# Patient Record
Sex: FEMALE | Race: WHITE | NOT HISPANIC OR LATINO | Employment: OTHER | URBAN - METROPOLITAN AREA
[De-identification: names, ages, dates, MRNs, and addresses within clinical notes are randomized per-mention and may not be internally consistent; named-entity substitution may affect disease eponyms.]

---

## 2017-01-31 NOTE — PATIENT DISCUSSION
DRY EYE SYNDROME, OU: PRESCRIBED ARTIFICIAL TEARS BID - QID, OU (SYSTANE BALANCE). TREAT UNDERLYING BLEPHARITIS. RETURN FOR FOLLOW-UP AS SCHEDULED OR SOONER IF SYMPTOMS WORSEN.

## 2017-01-31 NOTE — PATIENT DISCUSSION
DERMATOCHALASIS (UPPER LIDS), OD&gt;OS:  VISUALLY SIGNIFICANT TO PATIENT. REFER TO OCULOPLASTIC SPECIALIST DR. ZAYAS FOR CONSULT.

## 2017-01-31 NOTE — PATIENT DISCUSSION
PCF OS &gt; OD: BECOMING VISUALLY SIGNIFICANT BUT NOT BOTHERSOME TO PATIENT AT THIS TIME. CONTINUE TO FOLLOW FOR NOW. OFFER SPEC RX UPDATE.

## 2017-01-31 NOTE — PATIENT DISCUSSION
GLAUCOMA SUSPECT, OU: ENLARGED OPTIC NERVE CUPPING AND POSITIVE FAMILY HISTORY. RETURN FOR FOLLOW UP AS SCHEDULED.

## 2017-01-31 NOTE — PATIENT DISCUSSION
Dermatochalasis Counseling:  I have explained the diagnosis of dermatochalasis to the patient. The resulting symptoms, including but not limited to visual field deficits, blepharitis, and/or dry eye symptoms from eyelid wick syndrome, that may result if left untreated were discussed with the patient. The patient understands that intervention is elective surgery and consultation with an oculoplastic specialist can be scheduled at their convenience.

## 2017-01-31 NOTE — PATIENT DISCUSSION
BLEPHARITIS, OU: PRESCRIBE WARM COMPRESSES AND SYSTANE EYELID SCRUBS BID AND ARTIFICIAL TEARS (SYSTANE BALANCE) QID. ADD A DAILY INTAKE OF OMEGA-3 FATTY ACIDS. WILL CONSIDER ZYLET OR LIPIFLOW TREATMENT NEXT VISIT IF NOT RESPONSIVE TO TREATMENT OR IF SYMPTOMS PERSIST. RETURN FOR FOLLOW-UP AS SCHEDULED.

## 2017-06-13 NOTE — PATIENT DISCUSSION
Ruiz Visual Field 36 point screen: I have reviewed the visual fields both taped and untaped on this patient which demonstrate significant obstruction of the patient's peripheral visual field on both eyes.

## 2017-08-18 NOTE — PATIENT DISCUSSION
Also, please do not hesitate to call us if you have any concerns not addressed by this information. Please call [phone] and we will do everything we can to help you during this period.

## 2017-11-03 ENCOUNTER — PREPPED CHART (OUTPATIENT)
Dept: URBAN - METROPOLITAN AREA CLINIC 46 | Facility: CLINIC | Age: 57
End: 2017-11-03

## 2017-11-03 ASSESSMENT — VISUAL ACUITY
OD_SC: CF 2FT
OD_CC: 20/20-1
OD_CC: J1
OD_SC: J3
OS_SC: J3
OS_CC: J1
OS_CC: 20/25+1
OS_SC: CF 2FT

## 2017-11-03 ASSESSMENT — TONOMETRY
OD_IOP_MMHG: 14
OS_IOP_MMHG: 14

## 2018-06-25 ENCOUNTER — ESTABLISHED COMPREHENSIVE EXAM (OUTPATIENT)
Dept: URBAN - METROPOLITAN AREA CLINIC 46 | Facility: CLINIC | Age: 58
End: 2018-06-25

## 2018-06-25 DIAGNOSIS — H52.7: ICD-10-CM

## 2018-06-25 DIAGNOSIS — H25.813: ICD-10-CM

## 2018-06-25 PROCEDURE — 92015 DETERMINE REFRACTIVE STATE: CPT

## 2018-06-25 PROCEDURE — 92014 COMPRE OPH EXAM EST PT 1/>: CPT

## 2018-06-25 ASSESSMENT — VISUAL ACUITY
OD_SC: 20/400
OS_SC: J3
OS_SC: 20/400
OD_SC: J2
OD_CC: J2
OS_CC: 20/25-1
OD_PH: 20/60-1
OS_BAT: 20/400
OS_CC: J3
OS_PH: 20/60-2
OD_BAT: 20/60
OD_CC: 20/20-2

## 2018-06-25 ASSESSMENT — TONOMETRY
OS_IOP_MMHG: 13
OD_IOP_MMHG: 14

## 2018-08-09 NOTE — PATIENT DISCUSSION
DRY EYE SYNDROME, OU: CONTINUE SYSTANE ARTIFICIAL TEARS BID - QID, OU. RETURN FOR FOLLOW-UP AS SCHEDULED OR SOONER IF SYMPTOMS WORSEN.

## 2018-08-09 NOTE — PATIENT DISCUSSION
POSTERIOR VITREOUS DETACHMENT, OU: NO HOLES OR TEARS 360' WITH INDIRECT EXAM OU. RETURN FOR FOLLOW-UP AS SCHEDULED.

## 2018-08-09 NOTE — PATIENT DISCUSSION
CONJUNCTIVITIS (ALLERGIC), OU:  PRESCRIBE PATANOL BID OU FOR RELIEF OF OCULAR ITCH AS NEEDED. RETURN FOR FOLLOW-UP AS SCHEDULED.

## 2018-08-09 NOTE — PATIENT DISCUSSION
New Prescription: Patanol (olopatadine): drops: 0.1% 1 drop twice a day as directed into both eyes 08-

## 2018-08-09 NOTE — PATIENT DISCUSSION
GLAUCOMA SUSPECT, OU:  POSITIVE FAMILY HISTORY AND CUPPING. STABLE RNFL AND IOP OU. RECOMMEND BASELINE 24-2 VISUAL FIELD. RETURN FOR FOLLOW UP AS SCHEDULED.

## 2019-10-03 NOTE — PATIENT DISCUSSION
GLAUCOMA SUSPECT, OU: POSITIVE FAMILY HISTORY AND CUPPING. STABLE IOP OU. ORDER BASELINE 24-2 VISUAL FIELD WITH SAME DAY RNFL OCT. RETURN FOR FOLLOW UP AS SCHEDULED.

## 2019-10-03 NOTE — PATIENT DISCUSSION
AMD (DRY), OU: PRESCRIBE AMSLER GRID QD/ UV PROTECTION. SMOKING CESSATION EMPHASIZED. RETURN FOR FOLLOW-UP AS SCHEDULED.

## 2019-11-25 ENCOUNTER — ESTABLISHED COMPREHENSIVE EXAM (OUTPATIENT)
Dept: URBAN - METROPOLITAN AREA CLINIC 46 | Facility: CLINIC | Age: 59
End: 2019-11-25

## 2019-11-25 DIAGNOSIS — H25.813: ICD-10-CM

## 2019-11-25 DIAGNOSIS — H52.7: ICD-10-CM

## 2019-11-25 PROCEDURE — 92015 DETERMINE REFRACTIVE STATE: CPT

## 2019-11-25 PROCEDURE — 92014 COMPRE OPH EXAM EST PT 1/>: CPT

## 2019-11-25 ASSESSMENT — VISUAL ACUITY
OS_CC: J2
OS_SC: J1+
OD_SC: J1+
OD_SC: 20/400
OD_CC: J2
OS_SC: 20/400
OS_CC: 20/30
OD_CC: 20/30

## 2019-11-25 ASSESSMENT — TONOMETRY
OD_IOP_MMHG: 15
OS_IOP_MMHG: 16

## 2020-10-05 NOTE — PATIENT DISCUSSION
GLAUCOMA SUSPECT, OU: POSITIVE FAMILY HISTORY AND CUPPING. STABLE IOP OU. OCT SHOWS THINNING INF OD- RTC FOR HVF/PACHS 6 MONTHS.

## 2020-10-05 NOTE — PATIENT DISCUSSION
CONJUNCTIVITIS (ALLERGIC), OU: PRESCRIBE OTC PATANOL BID OU FOR RELIEF OF OCULAR ITCH AS NEEDED. RETURN FOR FOLLOW-UP AS SCHEDULED.

## 2021-01-28 ENCOUNTER — ESTABLISHED COMPREHENSIVE EXAM (OUTPATIENT)
Dept: URBAN - METROPOLITAN AREA CLINIC 46 | Facility: CLINIC | Age: 61
End: 2021-01-28

## 2021-01-28 DIAGNOSIS — H04.123: ICD-10-CM

## 2021-01-28 DIAGNOSIS — H25.812: ICD-10-CM

## 2021-01-28 DIAGNOSIS — H52.7: ICD-10-CM

## 2021-01-28 DIAGNOSIS — H25.811: ICD-10-CM

## 2021-01-28 DIAGNOSIS — H34.8312: ICD-10-CM

## 2021-01-28 PROCEDURE — 92014 COMPRE OPH EXAM EST PT 1/>: CPT

## 2021-01-28 PROCEDURE — 92015 DETERMINE REFRACTIVE STATE: CPT

## 2021-01-28 ASSESSMENT — VISUAL ACUITY
OD_CC: 20/40-2
OS_SC: J1+ @ 8"
OD_CC: J1+
OD_SC: CF 6FT
OD_SC: J1+ @ 8"
OS_CC: 20/30-2
OD_PH: 20/30
OS_CC: J1+
OS_SC: CF 5FT

## 2021-01-28 ASSESSMENT — TONOMETRY
OS_IOP_MMHG: 17
OD_IOP_MMHG: 17

## 2021-08-24 NOTE — PATIENT DISCUSSION
ALLERGIC CONJUNCTIVITIS OU: EDUCATED PATIENT ON FINDINGS. CONTINUE OTC PATADAY BID/PRN OU. ADVISED TO RTC IF SI/SX PERSIST OR WORSEN. MONITOR.

## 2021-08-24 NOTE — PATIENT DISCUSSION
BLEPHARITIS/MGD/DRY EYE SYNDROME OU: EDUCATED PATIENT ON FINDINGS AND CONDITIONS. PRESCRIBE ARTIFICIAL TEARS QID/PRN OU AND WARM COMPRESSES QD-BID OU. ADVISED TO RTC IF SI/SX PERSIST OR WORSEN. MONITOR.

## 2021-12-14 ENCOUNTER — APPOINTMENT (RX ONLY)
Dept: URBAN - METROPOLITAN AREA CLINIC 153 | Facility: CLINIC | Age: 61
Setting detail: DERMATOLOGY
End: 2021-12-14

## 2021-12-14 DIAGNOSIS — L81.4 OTHER MELANIN HYPERPIGMENTATION: ICD-10-CM

## 2021-12-14 DIAGNOSIS — B07.8 OTHER VIRAL WARTS: ICD-10-CM

## 2021-12-14 DIAGNOSIS — L82.1 OTHER SEBORRHEIC KERATOSIS: ICD-10-CM

## 2021-12-14 DIAGNOSIS — Z71.89 OTHER SPECIFIED COUNSELING: ICD-10-CM

## 2021-12-14 DIAGNOSIS — D22 MELANOCYTIC NEVI: ICD-10-CM

## 2021-12-14 DIAGNOSIS — L21.8 OTHER SEBORRHEIC DERMATITIS: ICD-10-CM

## 2021-12-14 DIAGNOSIS — D18.0 HEMANGIOMA: ICD-10-CM

## 2021-12-14 PROBLEM — D48.5 NEOPLASM OF UNCERTAIN BEHAVIOR OF SKIN: Status: ACTIVE | Noted: 2021-12-14

## 2021-12-14 PROBLEM — D22.5 MELANOCYTIC NEVI OF TRUNK: Status: ACTIVE | Noted: 2021-12-14

## 2021-12-14 PROBLEM — D18.01 HEMANGIOMA OF SKIN AND SUBCUTANEOUS TISSUE: Status: ACTIVE | Noted: 2021-12-14

## 2021-12-14 PROBLEM — L30.9 DERMATITIS, UNSPECIFIED: Status: ACTIVE | Noted: 2021-12-14

## 2021-12-14 PROCEDURE — ? COUNSELING

## 2021-12-14 PROCEDURE — 11102 TANGNTL BX SKIN SINGLE LES: CPT | Mod: 59

## 2021-12-14 PROCEDURE — ? TREATMENT REGIMEN

## 2021-12-14 PROCEDURE — ? LIQUID NITROGEN

## 2021-12-14 PROCEDURE — ? BIOPSY BY SHAVE METHOD

## 2021-12-14 PROCEDURE — 11103 TANGNTL BX SKIN EA SEP/ADDL: CPT | Mod: 59

## 2021-12-14 PROCEDURE — ? PRESCRIPTION

## 2021-12-14 PROCEDURE — 99203 OFFICE O/P NEW LOW 30 MIN: CPT | Mod: 25

## 2021-12-14 PROCEDURE — 17110 DESTRUCTION B9 LES UP TO 14: CPT

## 2021-12-14 RX ORDER — TRIAMCINOLONE ACETONIDE 1 MG/G
1 OINTMENT TOPICAL BID
Qty: 30 | Refills: 0 | Status: ERX | COMMUNITY
Start: 2021-12-14

## 2021-12-14 RX ADMIN — TRIAMCINOLONE ACETONIDE 1: 1 OINTMENT TOPICAL at 00:00

## 2021-12-14 ASSESSMENT — LOCATION ZONE DERM
LOCATION ZONE: TRUNK
LOCATION ZONE: NECK
LOCATION ZONE: ARM

## 2021-12-14 ASSESSMENT — LOCATION DETAILED DESCRIPTION DERM
LOCATION DETAILED: RIGHT ANTERIOR SHOULDER
LOCATION DETAILED: LEFT VENTRAL MEDIAL PROXIMAL FOREARM
LOCATION DETAILED: PERIUMBILICAL SKIN
LOCATION DETAILED: EPIGASTRIC SKIN
LOCATION DETAILED: LEFT INFERIOR LATERAL NECK

## 2021-12-14 ASSESSMENT — LOCATION SIMPLE DESCRIPTION DERM
LOCATION SIMPLE: RIGHT SHOULDER
LOCATION SIMPLE: ABDOMEN
LOCATION SIMPLE: LEFT ANTERIOR NECK
LOCATION SIMPLE: LEFT FOREARM

## 2021-12-14 NOTE — PROCEDURE: BIOPSY BY SHAVE METHOD
Detail Level: Detailed
Depth Of Biopsy: dermis
Was A Bandage Applied: Yes
Size Of Lesion In Cm: 0.5
X Size Of Lesion In Cm: 0.4
Biopsy Type: H and E
Biopsy Method: Dermablade
Anesthesia Type: 1% lidocaine with epinephrine
Anesthesia Volume In Cc (Will Not Render If 0): 1.5
Additional Anesthesia Volume In Cc (Will Not Render If 0): 0
Hemostasis: Drysol
Wound Care: Aquaphor
Dressing: bandage
Destruction After The Procedure: No
Type Of Destruction Used: Curettage
Curettage Text: The wound bed was treated with curettage after the biopsy was performed.
Cryotherapy Text: The wound bed was treated with cryotherapy after the biopsy was performed.
Electrodesiccation Text: The wound bed was treated with electrodesiccation after the biopsy was performed.
Electrodesiccation And Curettage Text: The wound bed was treated with electrodesiccation and curettage after the biopsy was performed.
Silver Nitrate Text: The wound bed was treated with silver nitrate after the biopsy was performed.
Consent: Written consent was obtained and risks were reviewed including but not limited to scarring, infection, bleeding, scabbing, incomplete removal, nerve damage and allergy to anesthesia.
Post-Care Instructions: I reviewed with the patient in detail post-care instructions. Patient is to wash biopsy site daily with antibacterial soap and water, pat dry and applied vaseline and a non-stick bandage daily until fully healed.
Notification Instructions: Patient will be notified of biopsy results. However, patient instructed to call the office if not contacted within 2 weeks.
Billing Type: Third-Party Bill
Information: Selecting Yes will display possible errors in your note based on the variables you have selected. This validation is only offered as a suggestion for you. PLEASE NOTE THAT THE VALIDATION TEXT WILL BE REMOVED WHEN YOU FINALIZE YOUR NOTE. IF YOU WANT TO FAX A PRELIMINARY NOTE YOU WILL NEED TO TOGGLE THIS TO 'NO' IF YOU DO NOT WANT IT IN YOUR FAXED NOTE.
Size Of Lesion In Cm: 1
X Size Of Lesion In Cm: 0.8

## 2021-12-14 NOTE — PROCEDURE: TREATMENT REGIMEN
Initiate Treatment: triamcinolone acetonide 0.1 % topical ointment apply BID to affected areas behind ears for two weeks, then as needed for flare ups
Plan: Patient is to contact the office if she has any questions, problems or concerns.
Detail Level: Simple

## 2021-12-14 NOTE — PROCEDURE: LIQUID NITROGEN
Include Z78.9 (Other Specified Conditions Influencing Health Status) As An Associated Diagnosis?: No
Consent: The patient's verbal consent was obtained including but not limited to risks of crusting, scabbing, blistering, scarring, darker or lighter pigmentary change, recurrence, incomplete removal and infection.
Detail Level: Detailed
Show Aperture Variable?: Yes
Medical Necessity Information: It is in your best interest to select a reason for this procedure from the list below. All of these items fulfill various CMS LCD requirements except the new and changing color options.
Medical Necessity Clause: This procedure was medically necessary because the lesions that were treated were:
Number Of Freeze-Thaw Cycles: 3 freeze-thaw cycles
Duration Of Freeze Thaw-Cycle (Seconds): 3
Post-Care Instructions: I reviewed with the patient in detail post-care instructions. Patient is to wear sunprotection, and avoid picking at any of the treated lesions. Pt may apply Vaseline to crusted or scabbing areas. If any area treated is still present after four weeks patient was instructed to contact the office for further evaluation.

## 2021-12-17 ENCOUNTER — APPOINTMENT (RX ONLY)
Dept: URBAN - METROPOLITAN AREA CLINIC 153 | Facility: CLINIC | Age: 61
Setting detail: DERMATOLOGY
End: 2021-12-17

## 2021-12-17 DIAGNOSIS — D22 MELANOCYTIC NEVI: ICD-10-CM

## 2021-12-17 PROBLEM — D22.62 MELANOCYTIC NEVI OF LEFT UPPER LIMB, INCLUDING SHOULDER: Status: ACTIVE | Noted: 2021-12-17

## 2021-12-17 PROBLEM — D22.61 MELANOCYTIC NEVI OF RIGHT UPPER LIMB, INCLUDING SHOULDER: Status: ACTIVE | Noted: 2021-12-17

## 2021-12-17 PROCEDURE — ? SHAVE REMOVAL

## 2021-12-17 PROCEDURE — 11300 SHAVE SKIN LESION 0.5 CM/<: CPT | Mod: 79

## 2021-12-17 PROCEDURE — 11301 SHAVE SKIN LESION 0.6-1.0 CM: CPT | Mod: 79

## 2021-12-17 ASSESSMENT — LOCATION SIMPLE DESCRIPTION DERM
LOCATION SIMPLE: LEFT FOREARM
LOCATION SIMPLE: RIGHT SHOULDER

## 2021-12-17 ASSESSMENT — LOCATION ZONE DERM: LOCATION ZONE: ARM

## 2021-12-17 ASSESSMENT — LOCATION DETAILED DESCRIPTION DERM
LOCATION DETAILED: RIGHT ANTERIOR SHOULDER
LOCATION DETAILED: LEFT VENTRAL DISTAL FOREARM

## 2021-12-17 NOTE — PROCEDURE: SHAVE REMOVAL
Medical Necessity Information: It is in your best interest to select a reason for this procedure from the list below. All of these items fulfill various CMS LCD requirements except the new and changing color options.
Medical Necessity Clause: This procedure was medically necessary because the lesion that was treated was:
Lab: 0
Detail Level: Detailed
Was A Bandage Applied: Yes
Size Of Lesion In Cm (Required): 0.5
X Size Of Lesion In Cm (Optional): 0.4
Size Of Margin In Cm (Margins Are Not Added To Billing Dimensions): 0.2
Biopsy Method: Dermablade
Anesthesia Type: 1% lidocaine with epinephrine
Hemostasis: Drysol
Wound Care: Petrolatum
Render Path Notes In Note?: No
Consent was obtained from the patient. The risks and benefits to therapy were discussed in detail. Specifically, the risks of infection, scarring, bleeding, prolonged wound healing, incomplete removal, allergy to anesthesia, nerve injury and recurrence were addressed. Prior to the procedure, the treatment site was clearly identified and confirmed by the patient. All components of Universal Protocol/PAUSE Rule completed.
Post-Care Instructions: I reviewed with the patient in detail post-care instructions. Patient is to keep the biopsy site dry overnight, and then apply bacitracin twice daily until healed. Patient may apply hydrogen peroxide soaks to remove any crusting.
Notification Instructions: Patient will be notified of pathology results. However, patient instructed to call the office if not contacted within 2 weeks.
Billing Type: Third-Party Bill
Size Of Lesion In Cm (Required): 1
X Size Of Lesion In Cm (Optional): 0.8

## 2022-01-26 NOTE — PATIENT DISCUSSION
Visually significant PCO present on exam today. Cannot improve vision with refraction/glasses at this time. Discussed treatment options including observation versus Yag capsulotomy. Laser recommended to improve vision. We discussed the risks/benefits of laser capsulotomy. All questions were answered. Patient elects to proceed. Schedule Yag capsulotomy OD only.

## 2022-01-31 ENCOUNTER — COMPREHENSIVE EXAM (OUTPATIENT)
Dept: URBAN - METROPOLITAN AREA CLINIC 46 | Facility: CLINIC | Age: 62
End: 2022-01-31

## 2022-01-31 DIAGNOSIS — H04.123: ICD-10-CM

## 2022-01-31 DIAGNOSIS — H52.7: ICD-10-CM

## 2022-01-31 PROCEDURE — 92014 COMPRE OPH EXAM EST PT 1/>: CPT

## 2022-01-31 PROCEDURE — 68761Q PUNCTAL PLUG/QUINTESS DISSOLVABLE PLUG/EACH

## 2022-01-31 PROCEDURE — A4262 TEMPORARY TEAR DUCT PLUG: HCPCS

## 2022-01-31 PROCEDURE — 92015 DETERMINE REFRACTIVE STATE: CPT

## 2022-01-31 ASSESSMENT — VISUAL ACUITY
OS_CC: 20/30-1
OD_CC: J1+
OS_BAT: 20/200
OD_CC: 20/40-1
OS_CC: J3
OD_SC: 20/400
OS_SC: 20/400
OS_SC: J5
OD_BAT: 20/400
OD_SC: J>10

## 2022-01-31 ASSESSMENT — TONOMETRY
OS_IOP_MMHG: 16
OD_IOP_MMHG: 16

## 2022-07-25 ENCOUNTER — FOLLOW UP (OUTPATIENT)
Dept: URBAN - METROPOLITAN AREA CLINIC 46 | Facility: CLINIC | Age: 62
End: 2022-07-25

## 2022-07-25 DIAGNOSIS — H04.123: ICD-10-CM

## 2022-07-25 DIAGNOSIS — H34.8312: ICD-10-CM

## 2022-07-25 DIAGNOSIS — H25.813: ICD-10-CM

## 2022-07-25 PROCEDURE — 92012 INTRM OPH EXAM EST PATIENT: CPT

## 2022-07-25 PROCEDURE — A4262 TEMPORARY TEAR DUCT PLUG: HCPCS

## 2022-07-25 PROCEDURE — 68761Q PUNCTAL PLUG/QUINTESS DISSOLVABLE PLUG/EACH

## 2022-07-25 ASSESSMENT — VISUAL ACUITY
OU_SC: 20/400
OU_CC: J2
OD_CC: 20/60-2
OU_CC: 20/60-1
OD_CC: J2
OD_PH: 20/40-1
OD_SC: 20/400
OS_PH: 20/40
OS_CC: J3
OS_SC: 20/400
OS_CC: 20/60-2

## 2022-07-25 ASSESSMENT — TONOMETRY
OS_IOP_MMHG: 16
OD_IOP_MMHG: 16

## 2022-12-13 ENCOUNTER — APPOINTMENT (RX ONLY)
Dept: URBAN - METROPOLITAN AREA CLINIC 153 | Facility: CLINIC | Age: 62
Setting detail: DERMATOLOGY
End: 2022-12-13

## 2022-12-13 DIAGNOSIS — D18.0 HEMANGIOMA: ICD-10-CM

## 2022-12-13 DIAGNOSIS — L82.1 OTHER SEBORRHEIC KERATOSIS: ICD-10-CM

## 2022-12-13 DIAGNOSIS — Z71.89 OTHER SPECIFIED COUNSELING: ICD-10-CM

## 2022-12-13 DIAGNOSIS — D22 MELANOCYTIC NEVI: ICD-10-CM

## 2022-12-13 DIAGNOSIS — L30.9 DERMATITIS, UNSPECIFIED: ICD-10-CM | Status: INADEQUATELY CONTROLLED

## 2022-12-13 DIAGNOSIS — T07XXXA INSECT BITE, NONVENOMOUS, OF OTHER, MULTIPLE, AND UNSPECIFIED SITES, WITHOUT MENTION OF INFECTION: ICD-10-CM

## 2022-12-13 DIAGNOSIS — Z85.828 PERSONAL HISTORY OF OTHER MALIGNANT NEOPLASM OF SKIN: ICD-10-CM

## 2022-12-13 PROBLEM — D23.39 OTHER BENIGN NEOPLASM OF SKIN OF OTHER PARTS OF FACE: Status: ACTIVE | Noted: 2022-12-13

## 2022-12-13 PROBLEM — D18.01 HEMANGIOMA OF SKIN AND SUBCUTANEOUS TISSUE: Status: ACTIVE | Noted: 2022-12-13

## 2022-12-13 PROBLEM — D22.5 MELANOCYTIC NEVI OF TRUNK: Status: ACTIVE | Noted: 2022-12-13

## 2022-12-13 PROBLEM — S20.469A INSECT BITE (NONVENOMOUS) OF UNSPECIFIED BACK WALL OF THORAX, INITIAL ENCOUNTER: Status: ACTIVE | Noted: 2022-12-13

## 2022-12-13 PROCEDURE — ? PRESCRIPTION MEDICATION MANAGEMENT

## 2022-12-13 PROCEDURE — ? COUNSELING

## 2022-12-13 PROCEDURE — ? PRESCRIPTION

## 2022-12-13 PROCEDURE — 99213 OFFICE O/P EST LOW 20 MIN: CPT

## 2022-12-13 RX ORDER — BETAMETHASONE DIPROPIONATE 0.5 MG/G
OINTMENT TOPICAL BID
Qty: 45 | Refills: 0 | Status: ERX | COMMUNITY
Start: 2022-12-13

## 2022-12-13 RX ADMIN — BETAMETHASONE DIPROPIONATE 1: 0.5 OINTMENT TOPICAL at 00:00

## 2022-12-13 ASSESSMENT — LOCATION DETAILED DESCRIPTION DERM
LOCATION DETAILED: EPIGASTRIC SKIN
LOCATION DETAILED: PERIUMBILICAL SKIN
LOCATION DETAILED: LEFT DISTAL PRETIBIAL REGION
LOCATION DETAILED: RIGHT SUPERIOR UPPER BACK
LOCATION DETAILED: RIGHT DISTAL PRETIBIAL REGION
LOCATION DETAILED: LEFT SUPERIOR LATERAL UPPER BACK

## 2022-12-13 ASSESSMENT — LOCATION SIMPLE DESCRIPTION DERM
LOCATION SIMPLE: LEFT UPPER BACK
LOCATION SIMPLE: LEFT PRETIBIAL REGION
LOCATION SIMPLE: RIGHT PRETIBIAL REGION
LOCATION SIMPLE: RIGHT UPPER BACK
LOCATION SIMPLE: ABDOMEN

## 2022-12-13 ASSESSMENT — LOCATION ZONE DERM
LOCATION ZONE: TRUNK
LOCATION ZONE: LEG

## 2022-12-13 NOTE — PROCEDURE: COUNSELING
Detail Level: Detailed
Detail Level: Simple
Detail Level: Zone
Detail Level: Generalized
Patient Specific Counseling (Will Not Stick From Patient To Patient): If ever bothered by they could be removed with a laser. Quoted $100

## 2022-12-13 NOTE — PROCEDURE: PRESCRIPTION MEDICATION MANAGEMENT
Render In Strict Bullet Format?: No
Detail Level: Zone
Plan: Prescribe betamethosone 0.05% ointment to rash on lower legs for three to five days then as needed for flare ups. Continue moisturizing and sunscreen daily and call with any questions or concerns.
Plan: Apply betamethosone 0.05% ointment to bug bites for 2-3 days as needed for itching.

## 2023-09-26 ENCOUNTER — FOLLOW UP (OUTPATIENT)
Dept: URBAN - METROPOLITAN AREA CLINIC 46 | Facility: CLINIC | Age: 63
End: 2023-09-26

## 2023-09-26 DIAGNOSIS — H25.813: ICD-10-CM

## 2023-09-26 DIAGNOSIS — H02.883: ICD-10-CM

## 2023-09-26 DIAGNOSIS — H04.123: ICD-10-CM

## 2023-09-26 DIAGNOSIS — H02.886: ICD-10-CM

## 2023-09-26 PROCEDURE — 92012 INTRM OPH EXAM EST PATIENT: CPT

## 2023-09-26 ASSESSMENT — VISUAL ACUITY
OD_CC: 20/50
OS_PH: 20/30
OS_CC: 20/70
OU_CC: 20/50
OD_PH: 20/30

## 2023-09-26 ASSESSMENT — TONOMETRY
OD_IOP_MMHG: 12
OS_IOP_MMHG: 13

## 2024-02-26 ENCOUNTER — COMPREHENSIVE EXAM (OUTPATIENT)
Dept: URBAN - METROPOLITAN AREA CLINIC 46 | Facility: CLINIC | Age: 64
End: 2024-02-26

## 2024-02-26 DIAGNOSIS — H02.883: ICD-10-CM

## 2024-02-26 DIAGNOSIS — H04.123: ICD-10-CM

## 2024-02-26 DIAGNOSIS — H02.886: ICD-10-CM

## 2024-02-26 DIAGNOSIS — H25.813: ICD-10-CM

## 2024-02-26 PROCEDURE — 92014 COMPRE OPH EXAM EST PT 1/>: CPT

## 2024-02-26 ASSESSMENT — VISUAL ACUITY
OD_SC: CF 6FT
OS_SC: CF 6FT
OD_BAT: 20/400
OS_CC: J1 @ 8"
OS_BAT: 20/400
OD_CC: J1 @ 8"
OD_PH: 20/30
OS_SC: >J10
OD_CC: 20/70+1
OS_CC: 20/80
OS_PH: 20/25-2
OD_SC: >J10

## 2024-02-26 ASSESSMENT — TONOMETRY
OD_IOP_MMHG: 13
OS_IOP_MMHG: 13

## 2024-03-19 ENCOUNTER — CONSULTATION/EVALUATION (OUTPATIENT)
Dept: URBAN - METROPOLITAN AREA CLINIC 43 | Facility: CLINIC | Age: 64
End: 2024-03-19

## 2024-03-19 DIAGNOSIS — H04.123: ICD-10-CM

## 2024-03-19 DIAGNOSIS — H34.8312: ICD-10-CM

## 2024-03-19 DIAGNOSIS — H02.886: ICD-10-CM

## 2024-03-19 DIAGNOSIS — H25.813: ICD-10-CM

## 2024-03-19 DIAGNOSIS — H02.883: ICD-10-CM

## 2024-03-19 PROCEDURE — 92134 CPTRZ OPH DX IMG PST SGM RTA: CPT

## 2024-03-19 PROCEDURE — 92286 ANT SGM IMG I&R SPECLR MIC: CPT

## 2024-03-19 PROCEDURE — 92025-1 CORNEAL TOPOGRAPHY, INS

## 2024-03-19 PROCEDURE — 99214 OFFICE O/P EST MOD 30 MIN: CPT

## 2024-03-19 PROCEDURE — V2799PMN IMPRIMIS PRED-MOXI-NEPAF 5ML

## 2024-03-19 PROCEDURE — 92136 OPHTHALMIC BIOMETRY: CPT

## 2024-03-19 ASSESSMENT — TONOMETRY
OS_IOP_MMHG: 12
OD_IOP_MMHG: 12

## 2024-03-19 ASSESSMENT — VISUAL ACUITY
OD_SC: CF 6FT
OS_SC: CF 6FT
OD_CC: 20/70-2
OS_CC: 20/80
OD_CC: J2
OS_SC: J10
OS_PH: 20/25+2
OD_SC: J10
OS_CC: J1
OD_PH: 20/25-2

## 2024-03-26 ENCOUNTER — CLINIC PROCEDURE ONLY (OUTPATIENT)
Dept: URBAN - METROPOLITAN AREA CLINIC 39 | Facility: CLINIC | Age: 64
End: 2024-03-26

## 2024-03-26 DIAGNOSIS — H04.123: ICD-10-CM

## 2024-03-26 PROCEDURE — 99199ST SERUM TEARS

## 2024-04-18 ENCOUNTER — SURGERY/PROCEDURE (OUTPATIENT)
Facility: LOCATION | Age: 64
End: 2024-04-18

## 2024-04-18 ENCOUNTER — PRE-OP/H&P (OUTPATIENT)
Facility: LOCATION | Age: 64
End: 2024-04-18

## 2024-04-18 DIAGNOSIS — H25.813: ICD-10-CM

## 2024-04-18 DIAGNOSIS — H04.123: ICD-10-CM

## 2024-04-18 DIAGNOSIS — H02.883: ICD-10-CM

## 2024-04-18 DIAGNOSIS — H02.886: ICD-10-CM

## 2024-04-18 PROCEDURE — 66984 XCAPSL CTRC RMVL W/O ECP: CPT

## 2024-04-18 PROCEDURE — 99211HP PRE-OP

## 2024-04-19 ENCOUNTER — POST-OP (OUTPATIENT)
Dept: URBAN - METROPOLITAN AREA CLINIC 46 | Facility: CLINIC | Age: 64
End: 2024-04-19

## 2024-04-19 DIAGNOSIS — Z96.1: ICD-10-CM

## 2024-04-19 PROCEDURE — 99024 POSTOP FOLLOW-UP VISIT: CPT

## 2024-04-19 ASSESSMENT — VISUAL ACUITY
OS_SC: >J10
OD_SC: J1
OS_SC: CF 6FT
OD_SC: 20/80
OD_PH: 20/30
OS_PH: 20/50

## 2024-04-19 ASSESSMENT — TONOMETRY
OS_IOP_MMHG: 14
OD_IOP_MMHG: 18

## 2024-04-22 ENCOUNTER — POST OP/EVAL OF SECOND EYE (OUTPATIENT)
Dept: URBAN - METROPOLITAN AREA CLINIC 46 | Facility: CLINIC | Age: 64
End: 2024-04-22

## 2024-04-22 DIAGNOSIS — H04.123: ICD-10-CM

## 2024-04-22 DIAGNOSIS — H02.883: ICD-10-CM

## 2024-04-22 DIAGNOSIS — H25.812: ICD-10-CM

## 2024-04-22 DIAGNOSIS — H34.8312: ICD-10-CM

## 2024-04-22 DIAGNOSIS — Z96.1: ICD-10-CM

## 2024-04-22 PROCEDURE — 99213 OFFICE O/P EST LOW 20 MIN: CPT | Mod: 24

## 2024-04-22 PROCEDURE — 99024 POSTOP FOLLOW-UP VISIT: CPT

## 2024-04-22 ASSESSMENT — VISUAL ACUITY
OD_SC: 20/150
OD_SC: J1+
OS_SC: CF 6FT
OS_SC: >J10
OD_PH: 20/30
OS_PH: 20/25

## 2024-04-22 ASSESSMENT — TONOMETRY
OD_IOP_MMHG: 14
OS_IOP_MMHG: 14

## 2024-04-25 ENCOUNTER — SURGERY/PROCEDURE (OUTPATIENT)
Facility: LOCATION | Age: 64
End: 2024-04-25

## 2024-04-25 ENCOUNTER — PRE-OP/H&P (OUTPATIENT)
Facility: LOCATION | Age: 64
End: 2024-04-25

## 2024-04-25 DIAGNOSIS — H25.812: ICD-10-CM

## 2024-04-25 DIAGNOSIS — Z96.1: ICD-10-CM

## 2024-04-25 PROCEDURE — 99211HP PRE-OP

## 2024-04-25 PROCEDURE — 66984 XCAPSL CTRC RMVL W/O ECP: CPT | Mod: 79,LT

## 2024-04-26 ENCOUNTER — POST-OP (OUTPATIENT)
Dept: URBAN - METROPOLITAN AREA CLINIC 43 | Facility: CLINIC | Age: 64
End: 2024-04-26

## 2024-04-26 DIAGNOSIS — Z96.1: ICD-10-CM

## 2024-04-26 PROCEDURE — 99024 POSTOP FOLLOW-UP VISIT: CPT

## 2024-04-26 ASSESSMENT — TONOMETRY
OD_IOP_MMHG: 14
OS_IOP_MMHG: 14

## 2024-04-26 ASSESSMENT — VISUAL ACUITY
OS_PH: 20/40
OD_SC: J1
OD_SC: 20/200
OD_PH: 20/25
OS_SC: J1
OS_SC: 20/200

## 2024-05-17 ENCOUNTER — POST-OP (OUTPATIENT)
Dept: URBAN - METROPOLITAN AREA CLINIC 46 | Facility: CLINIC | Age: 64
End: 2024-05-17

## 2024-05-17 DIAGNOSIS — Z96.1: ICD-10-CM

## 2024-05-17 PROCEDURE — 99024 POSTOP FOLLOW-UP VISIT: CPT

## 2024-05-17 ASSESSMENT — VISUAL ACUITY
OD_SC: 20/400
OS_SC: J1+
OD_SC: J1+
OS_SC: 20/200

## 2024-05-17 ASSESSMENT — TONOMETRY
OD_IOP_MMHG: 14
OS_IOP_MMHG: 13

## 2024-06-04 ENCOUNTER — APPOINTMENT (RX ONLY)
Dept: URBAN - METROPOLITAN AREA CLINIC 150 | Facility: CLINIC | Age: 64
Setting detail: DERMATOLOGY
End: 2024-06-04

## 2024-06-04 DIAGNOSIS — L81.4 OTHER MELANIN HYPERPIGMENTATION: ICD-10-CM

## 2024-06-04 DIAGNOSIS — D18.0 HEMANGIOMA: ICD-10-CM

## 2024-06-04 DIAGNOSIS — L82.0 INFLAMED SEBORRHEIC KERATOSIS: ICD-10-CM

## 2024-06-04 DIAGNOSIS — I83.9 ASYMPTOMATIC VARICOSE VEINS OF LOWER EXTREMITIES: ICD-10-CM

## 2024-06-04 DIAGNOSIS — Z87.2 PERSONAL HISTORY OF DISEASES OF THE SKIN AND SUBCUTANEOUS TISSUE: ICD-10-CM | Status: RESOLVED

## 2024-06-04 DIAGNOSIS — L82.1 OTHER SEBORRHEIC KERATOSIS: ICD-10-CM

## 2024-06-04 DIAGNOSIS — D22 MELANOCYTIC NEVI: ICD-10-CM

## 2024-06-04 DIAGNOSIS — L73.8 OTHER SPECIFIED FOLLICULAR DISORDERS: ICD-10-CM

## 2024-06-04 DIAGNOSIS — L90.5 SCAR CONDITIONS AND FIBROSIS OF SKIN: ICD-10-CM

## 2024-06-04 DIAGNOSIS — L91.8 OTHER HYPERTROPHIC DISORDERS OF THE SKIN: ICD-10-CM

## 2024-06-04 PROBLEM — D22.71 MELANOCYTIC NEVI OF RIGHT LOWER LIMB, INCLUDING HIP: Status: ACTIVE | Noted: 2024-06-04

## 2024-06-04 PROBLEM — I83.93 ASYMPTOMATIC VARICOSE VEINS OF BILATERAL LOWER EXTREMITIES: Status: ACTIVE | Noted: 2024-06-04

## 2024-06-04 PROBLEM — D18.01 HEMANGIOMA OF SKIN AND SUBCUTANEOUS TISSUE: Status: ACTIVE | Noted: 2024-06-04

## 2024-06-04 PROBLEM — D22.5 MELANOCYTIC NEVI OF TRUNK: Status: ACTIVE | Noted: 2024-06-04

## 2024-06-04 PROCEDURE — ? COUNSELING

## 2024-06-04 PROCEDURE — 17110 DESTRUCTION B9 LES UP TO 14: CPT

## 2024-06-04 PROCEDURE — ? FULL BODY SKIN EXAM

## 2024-06-04 PROCEDURE — ? LIQUID NITROGEN

## 2024-06-04 PROCEDURE — 99213 OFFICE O/P EST LOW 20 MIN: CPT | Mod: 25

## 2024-06-04 PROCEDURE — ? TREATMENT REGIMEN

## 2024-06-04 ASSESSMENT — LOCATION DETAILED DESCRIPTION DERM
LOCATION DETAILED: RIGHT ANTERIOR PROXIMAL THIGH
LOCATION DETAILED: SUPERIOR LUMBAR SPINE
LOCATION DETAILED: RIGHT LATERAL PROXIMAL PRETIBIAL REGION
LOCATION DETAILED: LEFT ANTERIOR PROXIMAL THIGH
LOCATION DETAILED: LEFT AXILLARY VAULT
LOCATION DETAILED: LEFT INFERIOR MEDIAL MALAR CHEEK
LOCATION DETAILED: RIGHT DISTAL LATERAL POSTERIOR THIGH
LOCATION DETAILED: RIGHT RADIAL DORSAL HAND
LOCATION DETAILED: RIGHT PROXIMAL CALF
LOCATION DETAILED: RIGHT SUPERIOR LATERAL MALAR CHEEK
LOCATION DETAILED: RIGHT MEDIAL FOREHEAD
LOCATION DETAILED: LEFT RIB CAGE
LOCATION DETAILED: LEFT INFERIOR MEDIAL LOWER BACK
LOCATION DETAILED: RIGHT DORSAL 2ND TOE
LOCATION DETAILED: RIGHT INFERIOR CENTRAL MALAR CHEEK
LOCATION DETAILED: LEFT SUPERIOR UPPER BACK
LOCATION DETAILED: RIGHT PROXIMAL PRETIBIAL REGION
LOCATION DETAILED: RIGHT RIB CAGE
LOCATION DETAILED: LEFT VENTRAL MEDIAL PROXIMAL FOREARM
LOCATION DETAILED: MIDDLE STERNUM
LOCATION DETAILED: RIGHT CLAVICULAR NECK
LOCATION DETAILED: LEFT SUPERIOR MEDIAL MIDBACK
LOCATION DETAILED: SUPERIOR THORACIC SPINE
LOCATION DETAILED: LEFT SUPERIOR LATERAL UPPER BACK
LOCATION DETAILED: LEFT INFERIOR LATERAL NECK
LOCATION DETAILED: LEFT PROXIMAL PRETIBIAL REGION
LOCATION DETAILED: LEFT ANTERIOR DISTAL THIGH
LOCATION DETAILED: RIGHT PROXIMAL DORSAL FOREARM
LOCATION DETAILED: LEFT PROXIMAL POSTERIOR UPPER ARM
LOCATION DETAILED: RIGHT MID-UPPER BACK
LOCATION DETAILED: EPIGASTRIC SKIN
LOCATION DETAILED: LEFT INFERIOR UPPER BACK
LOCATION DETAILED: RIGHT POPLITEAL SKIN
LOCATION DETAILED: RIGHT SUPERIOR MEDIAL BUCCAL CHEEK
LOCATION DETAILED: RIGHT ANTERIOR DISTAL THIGH
LOCATION DETAILED: LEFT KNEE
LOCATION DETAILED: LEFT PROXIMAL CALF
LOCATION DETAILED: LEFT INFERIOR LATERAL MIDBACK
LOCATION DETAILED: LEFT LATERAL UPPER BACK
LOCATION DETAILED: LEFT PROXIMAL DORSAL FOREARM
LOCATION DETAILED: RIGHT INFERIOR LATERAL MALAR CHEEK
LOCATION DETAILED: RIGHT SUPERIOR UPPER BACK
LOCATION DETAILED: LEFT DISTAL POSTERIOR THIGH
LOCATION DETAILED: LEFT DISTAL LATERAL POSTERIOR UPPER ARM
LOCATION DETAILED: LEFT POPLITEAL SKIN
LOCATION DETAILED: RIGHT POSTERIOR SHOULDER
LOCATION DETAILED: RIGHT INFERIOR UPPER BACK
LOCATION DETAILED: PERIUMBILICAL SKIN
LOCATION DETAILED: RIGHT ANTERIOR SHOULDER
LOCATION DETAILED: LEFT RADIAL DORSAL HAND
LOCATION DETAILED: RIGHT PROXIMAL POSTERIOR UPPER ARM

## 2024-06-04 ASSESSMENT — LOCATION ZONE DERM
LOCATION ZONE: LEG
LOCATION ZONE: TRUNK
LOCATION ZONE: TOE
LOCATION ZONE: FACE
LOCATION ZONE: HAND
LOCATION ZONE: NECK
LOCATION ZONE: AXILLAE
LOCATION ZONE: ARM

## 2024-06-04 ASSESSMENT — LOCATION SIMPLE DESCRIPTION DERM
LOCATION SIMPLE: LEFT CALF
LOCATION SIMPLE: RIGHT ANTERIOR NECK
LOCATION SIMPLE: LEFT LOWER BACK
LOCATION SIMPLE: LEFT THIGH
LOCATION SIMPLE: RIGHT 2ND TOE
LOCATION SIMPLE: UPPER BACK
LOCATION SIMPLE: RIGHT POSTERIOR UPPER ARM
LOCATION SIMPLE: RIGHT HAND
LOCATION SIMPLE: LEFT FOREARM
LOCATION SIMPLE: RIGHT PRETIBIAL REGION
LOCATION SIMPLE: LEFT ANTERIOR NECK
LOCATION SIMPLE: LEFT AXILLARY VAULT
LOCATION SIMPLE: RIGHT THIGH
LOCATION SIMPLE: RIGHT FOREHEAD
LOCATION SIMPLE: ABDOMEN
LOCATION SIMPLE: RIGHT UPPER BACK
LOCATION SIMPLE: LEFT PRETIBIAL REGION
LOCATION SIMPLE: LEFT HAND
LOCATION SIMPLE: RIGHT FOREARM
LOCATION SIMPLE: LOWER BACK
LOCATION SIMPLE: RIGHT POPLITEAL SKIN
LOCATION SIMPLE: LEFT POSTERIOR THIGH
LOCATION SIMPLE: CHEST
LOCATION SIMPLE: RIGHT SHOULDER
LOCATION SIMPLE: RIGHT POSTERIOR THIGH
LOCATION SIMPLE: RIGHT CHEEK
LOCATION SIMPLE: RIGHT CALF
LOCATION SIMPLE: LEFT POPLITEAL SKIN
LOCATION SIMPLE: LEFT POSTERIOR UPPER ARM
LOCATION SIMPLE: LEFT CHEEK
LOCATION SIMPLE: LEFT UPPER BACK
LOCATION SIMPLE: LEFT KNEE

## 2024-06-04 NOTE — PROCEDURE: LIQUID NITROGEN
Spray Paint Text: The liquid nitrogen was applied to the skin utilizing a spray paint frosting technique.
Post-Care Instructions: I reviewed with the patient in detail post-care instructions. Patient is to wear sunprotection, and avoid picking at any of the treated lesions. Pt may apply Vaseline to crusted or scabbing areas.
Show Spray Paint Technique Variable?: Yes
Duration Of Freeze Thaw-Cycle (Seconds): 10
Add 52 Modifier (Optional): no
Medical Necessity Clause: This procedure was medically necessary because the lesions that were treated were:
Number Of Freeze-Thaw Cycles: 2 freeze-thaw cycles
Consent: The patient's consent was obtained including but not limited to risks of crusting, scabbing, blistering, scarring, darker or lighter pigmentary change, recurrence, incomplete removal and infection.
Medical Necessity Information: It is in your best interest to select a reason for this procedure from the list below. All of these items fulfill various CMS LCD requirements except the new and changing color options.
Application Tool (Optional): Liquid Nitrogen Sprayer
Detail Level: Detailed

## 2024-06-18 ENCOUNTER — APPOINTMENT (RX ONLY)
Dept: URBAN - METROPOLITAN AREA CLINIC 150 | Facility: CLINIC | Age: 64
Setting detail: DERMATOLOGY
End: 2024-06-18

## 2024-06-18 DIAGNOSIS — L73.8 OTHER SPECIFIED FOLLICULAR DISORDERS: ICD-10-CM

## 2024-06-18 PROCEDURE — ? COUNSELING

## 2024-06-18 PROCEDURE — ? BENIGN DESTRUCTION COSMETIC

## 2024-06-18 ASSESSMENT — LOCATION DETAILED DESCRIPTION DERM
LOCATION DETAILED: RIGHT SUPERIOR MEDIAL BUCCAL CHEEK
LOCATION DETAILED: RIGHT CENTRAL ZYGOMA
LOCATION DETAILED: LEFT SUPERIOR CENTRAL BUCCAL CHEEK
LOCATION DETAILED: RIGHT MEDIAL MALAR CHEEK
LOCATION DETAILED: RIGHT INFERIOR CENTRAL MALAR CHEEK

## 2024-06-18 ASSESSMENT — LOCATION SIMPLE DESCRIPTION DERM
LOCATION SIMPLE: RIGHT CHEEK
LOCATION SIMPLE: LEFT CHEEK
LOCATION SIMPLE: RIGHT ZYGOMA

## 2024-06-18 ASSESSMENT — LOCATION ZONE DERM: LOCATION ZONE: FACE

## 2024-06-18 NOTE — HPI: SKIN LESION (SEBACEOUS HYPERPLASIA)
How Severe Are Your Lesions?: mild
Is This A New Presentation, Or A Follow-Up?: Follow Up Sebaceous Hyperplasia
Additional History: Patient is aware of the cosmetic charge of $175

## 2024-06-18 NOTE — PROCEDURE: BENIGN DESTRUCTION COSMETIC
Consent: The patient's consent was obtained including but not limited to risks of crusting, scabbing, blistering, scarring, darker or lighter pigmentary change, recurrence, incomplete removal and infection.
Price (Use Numbers Only, No Special Characters Or $): 175
Post-Care Instructions: I reviewed with the patient in detail post-care instructions. Patient is to wear sunprotection, and avoid picking at any of the treated lesions. Pt may apply Vaseline to crusted or scabbing areas.
Anesthesia Volume In Cc: 0.5
Detail Level: Zone

## 2025-01-27 NOTE — PATIENT DISCUSSION
A refill request was received for:  Requested Prescriptions     Pending Prescriptions Disp Refills    CLOPIDOGREL 75 MG Oral Tab [Pharmacy Med Name: Clopidogrel 75 Mg Tab Auro] 30 tablet 0     Sig: TAKE ONE TABLET BY MOUTH ONE TIME DAILY     Last refill date:  11/08/2024  Qty: 30 - No refills  Dx: Diabetes  Last office visit: 03/14/2024  When is follow up due: DUE      Future Appointments   Date Time Provider Department Center   3/4/2025  9:45 AM Shanell Juárez PA-C [facility]        Anti-reflective

## 2025-02-27 ENCOUNTER — COMPREHENSIVE EXAM (OUTPATIENT)
Age: 65
End: 2025-02-27

## 2025-02-27 DIAGNOSIS — Z96.1: ICD-10-CM

## 2025-02-27 DIAGNOSIS — H02.886: ICD-10-CM

## 2025-02-27 DIAGNOSIS — H04.123: ICD-10-CM

## 2025-02-27 DIAGNOSIS — H02.883: ICD-10-CM

## 2025-02-27 DIAGNOSIS — H34.8312: ICD-10-CM

## 2025-02-27 DIAGNOSIS — H52.7: ICD-10-CM

## 2025-02-27 PROCEDURE — 92014 COMPRE OPH EXAM EST PT 1/>: CPT

## 2025-02-27 PROCEDURE — 92015 DETERMINE REFRACTIVE STATE: CPT
